# Patient Record
Sex: MALE | Race: WHITE | Employment: FULL TIME | URBAN - NONMETROPOLITAN AREA
[De-identification: names, ages, dates, MRNs, and addresses within clinical notes are randomized per-mention and may not be internally consistent; named-entity substitution may affect disease eponyms.]

---

## 2015-08-25 LAB
CHOLESTEROL, TOTAL: 96 MG/DL
CHOLESTEROL/HDL RATIO: 2.1
HDLC SERPL-MCNC: 45 MG/DL (ref 35–70)
LDL CHOLESTEROL CALCULATED: 32.2 MG/DL (ref 0–160)
TRIGL SERPL-MCNC: 94 MG/DL
VLDLC SERPL CALC-MCNC: 19 MG/DL

## 2017-08-11 DIAGNOSIS — G40.909 NONINTRACTABLE EPILEPSY WITHOUT STATUS EPILEPTICUS, UNSPECIFIED EPILEPSY TYPE (HCC): Primary | ICD-10-CM

## 2017-08-11 RX ORDER — PHENOBARBITAL 32.4 MG/1
32.4 TABLET ORAL 2 TIMES DAILY
Qty: 270 TABLET | Refills: 0 | Status: SHIPPED | OUTPATIENT
Start: 2017-08-11 | End: 2017-11-22 | Stop reason: SDUPTHER

## 2017-08-23 VITALS
SYSTOLIC BLOOD PRESSURE: 118 MMHG | HEART RATE: 74 BPM | DIASTOLIC BLOOD PRESSURE: 76 MMHG | HEIGHT: 68 IN | WEIGHT: 153 LBS | BODY MASS INDEX: 23.19 KG/M2

## 2017-08-23 DIAGNOSIS — G40.909 SEIZURE DISORDER (HCC): ICD-10-CM

## 2017-08-23 DIAGNOSIS — Q99.2 FRAGILE X SYNDROME: ICD-10-CM

## 2017-08-23 DIAGNOSIS — F41.9 ANXIETY: ICD-10-CM

## 2017-08-23 RX ORDER — ASCORBIC ACID 500 MG
500 TABLET ORAL DAILY
COMMUNITY

## 2017-08-23 RX ORDER — ALPRAZOLAM 0.25 MG/1
0.25 TABLET ORAL DAILY
COMMUNITY
End: 2021-09-29 | Stop reason: ALTCHOICE

## 2017-08-29 ENCOUNTER — OFFICE VISIT (OUTPATIENT)
Dept: FAMILY MEDICINE CLINIC | Age: 37
End: 2017-08-29
Payer: MEDICARE

## 2017-08-29 VITALS
HEIGHT: 68 IN | WEIGHT: 152 LBS | BODY MASS INDEX: 23.04 KG/M2 | HEART RATE: 76 BPM | SYSTOLIC BLOOD PRESSURE: 118 MMHG | DIASTOLIC BLOOD PRESSURE: 72 MMHG

## 2017-08-29 DIAGNOSIS — F41.9 ANXIETY: ICD-10-CM

## 2017-08-29 DIAGNOSIS — G40.909 SEIZURE DISORDER (HCC): Primary | ICD-10-CM

## 2017-08-29 DIAGNOSIS — Q99.2 FRAGILE X SYNDROME: ICD-10-CM

## 2017-08-29 DIAGNOSIS — Z23 NEED FOR INFLUENZA VACCINATION: ICD-10-CM

## 2017-08-29 PROCEDURE — 90686 IIV4 VACC NO PRSV 0.5 ML IM: CPT | Performed by: FAMILY MEDICINE

## 2017-08-29 PROCEDURE — 99395 PREV VISIT EST AGE 18-39: CPT | Performed by: FAMILY MEDICINE

## 2017-08-29 PROCEDURE — G0008 ADMIN INFLUENZA VIRUS VAC: HCPCS | Performed by: FAMILY MEDICINE

## 2017-08-29 ASSESSMENT — PATIENT HEALTH QUESTIONNAIRE - PHQ9
SUM OF ALL RESPONSES TO PHQ9 QUESTIONS 1 & 2: 0
2. FEELING DOWN, DEPRESSED OR HOPELESS: 0
1. LITTLE INTEREST OR PLEASURE IN DOING THINGS: 0
SUM OF ALL RESPONSES TO PHQ QUESTIONS 1-9: 0

## 2017-08-29 ASSESSMENT — ENCOUNTER SYMPTOMS
SHORTNESS OF BREATH: 0
WHEEZING: 0
ABDOMINAL PAIN: 0
ABDOMINAL DISTENTION: 0
CHEST TIGHTNESS: 0

## 2017-11-22 DIAGNOSIS — G40.909 NONINTRACTABLE EPILEPSY WITHOUT STATUS EPILEPTICUS, UNSPECIFIED EPILEPSY TYPE (HCC): ICD-10-CM

## 2017-11-22 RX ORDER — PHENOBARBITAL 32.4 MG/1
TABLET ORAL
Qty: 270 TABLET | Refills: 0 | Status: SHIPPED | OUTPATIENT
Start: 2017-11-22 | End: 2018-02-21 | Stop reason: SDUPTHER

## 2017-11-22 NOTE — TELEPHONE ENCOUNTER
Jesika Alcala is calling to request a refill on the following medication(s):  Requested Prescriptions     Pending Prescriptions Disp Refills    PHENobarbital (LUMINAL) 32.4 MG tablet [Pharmacy Med Name: PHENOBARBITAL 32.4MG TAB] 270 tablet 0     Sig: TAKE ONE TABLET BY MOUTH THREE TIMES DAILY       Last Visit Date (If Applicable):  1/67/5222    Next Visit Date:    Visit date not found

## 2018-02-21 DIAGNOSIS — G40.909 NONINTRACTABLE EPILEPSY WITHOUT STATUS EPILEPTICUS, UNSPECIFIED EPILEPSY TYPE (HCC): ICD-10-CM

## 2018-02-21 RX ORDER — PHENOBARBITAL 32.4 MG/1
TABLET ORAL
Qty: 270 TABLET | Refills: 5 | Status: SHIPPED | OUTPATIENT
Start: 2018-02-21 | End: 2018-08-20

## 2018-02-21 NOTE — TELEPHONE ENCOUNTER
Haim Yu is calling to request a refill on the following medication(s):  Requested Prescriptions     Pending Prescriptions Disp Refills    PHENobarbital (LUMINAL) 32.4 MG tablet [Pharmacy Med Name: PHENOBARBITAL 32.4MG TAB] 270 tablet 0     Sig: TAKE ONE TABLET BY MOUTH THREE TIMES DAILY       Last Visit Date (If Applicable):  2/05/1510    Next Visit Date:    Visit date not found

## 2018-08-31 ENCOUNTER — OFFICE VISIT (OUTPATIENT)
Dept: FAMILY MEDICINE CLINIC | Age: 38
End: 2018-08-31
Payer: MEDICARE

## 2018-08-31 VITALS
BODY MASS INDEX: 22.88 KG/M2 | SYSTOLIC BLOOD PRESSURE: 126 MMHG | DIASTOLIC BLOOD PRESSURE: 88 MMHG | HEIGHT: 68 IN | WEIGHT: 151 LBS | HEART RATE: 80 BPM

## 2018-08-31 DIAGNOSIS — G40.909 SEIZURE DISORDER (HCC): Primary | ICD-10-CM

## 2018-08-31 DIAGNOSIS — F41.9 ANXIETY: ICD-10-CM

## 2018-08-31 DIAGNOSIS — Q99.2 FRAGILE X SYNDROME: ICD-10-CM

## 2018-08-31 PROCEDURE — 99213 OFFICE O/P EST LOW 20 MIN: CPT | Performed by: FAMILY MEDICINE

## 2018-08-31 RX ORDER — PHENOBARBITAL 32.4 MG/1
32.4 TABLET ORAL 2 TIMES DAILY
COMMUNITY
End: 2018-08-31 | Stop reason: SDUPTHER

## 2018-08-31 RX ORDER — PHENOBARBITAL 32.4 MG/1
32.4 TABLET ORAL 3 TIMES DAILY
Qty: 270 TABLET | Refills: 3 | Status: SHIPPED | OUTPATIENT
Start: 2018-08-31 | End: 2019-04-06 | Stop reason: SDUPTHER

## 2018-08-31 ASSESSMENT — PATIENT HEALTH QUESTIONNAIRE - PHQ9
SUM OF ALL RESPONSES TO PHQ QUESTIONS 1-9: 0
1. LITTLE INTEREST OR PLEASURE IN DOING THINGS: 0
SUM OF ALL RESPONSES TO PHQ QUESTIONS 1-9: 0
SUM OF ALL RESPONSES TO PHQ9 QUESTIONS 1 & 2: 0
2. FEELING DOWN, DEPRESSED OR HOPELESS: 0

## 2018-09-05 ASSESSMENT — ENCOUNTER SYMPTOMS
ABDOMINAL PAIN: 0
WHEEZING: 0
ABDOMINAL DISTENTION: 0
CHEST TIGHTNESS: 0
SHORTNESS OF BREATH: 0

## 2018-12-10 ENCOUNTER — OFFICE VISIT (OUTPATIENT)
Dept: FAMILY MEDICINE CLINIC | Age: 38
End: 2018-12-10
Payer: MEDICARE

## 2018-12-10 VITALS
WEIGHT: 158 LBS | HEART RATE: 79 BPM | SYSTOLIC BLOOD PRESSURE: 114 MMHG | BODY MASS INDEX: 24.38 KG/M2 | DIASTOLIC BLOOD PRESSURE: 80 MMHG | OXYGEN SATURATION: 98 %

## 2018-12-10 DIAGNOSIS — Z02.5 ROUTINE SPORTS PHYSICAL EXAM: Primary | ICD-10-CM

## 2018-12-10 PROCEDURE — 99395 PREV VISIT EST AGE 18-39: CPT | Performed by: FAMILY MEDICINE

## 2018-12-11 ASSESSMENT — ENCOUNTER SYMPTOMS
EYES NEGATIVE: 1
GASTROINTESTINAL NEGATIVE: 1
RESPIRATORY NEGATIVE: 1

## 2019-04-06 DIAGNOSIS — G40.909 SEIZURE DISORDER (HCC): ICD-10-CM

## 2019-04-06 NOTE — TELEPHONE ENCOUNTER
Lorena Marin is calling to request a refill on the following medication(s):  Requested Prescriptions     Pending Prescriptions Disp Refills    PHENobarbital (LUMINAL) 32.4 MG tablet [Pharmacy Med Name: PHENOBARBITAL 32.4MG TAB] 270 tablet 3     Sig: TAKE 1 TABLET BY MOUTH THREE TIMES DAILY       Last Visit Date (If Applicable):  11/21/5976    Next Visit Date:    9/9/2019

## 2019-04-08 RX ORDER — PHENOBARBITAL 32.4 MG/1
TABLET ORAL
Qty: 270 TABLET | Refills: 3 | Status: SHIPPED | OUTPATIENT
Start: 2019-04-08 | End: 2019-10-15 | Stop reason: SDUPTHER

## 2019-09-17 ENCOUNTER — OFFICE VISIT (OUTPATIENT)
Dept: FAMILY MEDICINE CLINIC | Age: 39
End: 2019-09-17
Payer: MEDICARE

## 2019-09-17 VITALS
OXYGEN SATURATION: 98 % | BODY MASS INDEX: 22.88 KG/M2 | DIASTOLIC BLOOD PRESSURE: 80 MMHG | SYSTOLIC BLOOD PRESSURE: 122 MMHG | WEIGHT: 151 LBS | HEIGHT: 68 IN | HEART RATE: 78 BPM

## 2019-09-17 DIAGNOSIS — Z23 NEED FOR TETANUS BOOSTER: ICD-10-CM

## 2019-09-17 DIAGNOSIS — Q99.2 FRAGILE X SYNDROME: Primary | ICD-10-CM

## 2019-09-17 DIAGNOSIS — F41.9 ANXIETY: ICD-10-CM

## 2019-09-17 DIAGNOSIS — G40.909 SEIZURE DISORDER (HCC): ICD-10-CM

## 2019-09-17 PROCEDURE — 90715 TDAP VACCINE 7 YRS/> IM: CPT | Performed by: FAMILY MEDICINE

## 2019-09-17 PROCEDURE — G0438 PPPS, INITIAL VISIT: HCPCS | Performed by: FAMILY MEDICINE

## 2019-09-17 PROCEDURE — 90471 IMMUNIZATION ADMIN: CPT | Performed by: FAMILY MEDICINE

## 2019-09-17 ASSESSMENT — PATIENT HEALTH QUESTIONNAIRE - PHQ9
DEPRESSION UNABLE TO ASSESS: FUNCTIONAL CAPACITY MOTIVATION LIMITS ACCURACY
2. FEELING DOWN, DEPRESSED OR HOPELESS: 0
SUM OF ALL RESPONSES TO PHQ QUESTIONS 1-9: 0
1. LITTLE INTEREST OR PLEASURE IN DOING THINGS: 0
SUM OF ALL RESPONSES TO PHQ9 QUESTIONS 1 & 2: 0
SUM OF ALL RESPONSES TO PHQ QUESTIONS 1-9: 0

## 2019-09-17 ASSESSMENT — ENCOUNTER SYMPTOMS
EYES NEGATIVE: 1
RESPIRATORY NEGATIVE: 1
GASTROINTESTINAL NEGATIVE: 1

## 2019-09-17 NOTE — PROGRESS NOTES
HIV screen  12/10/2028 (Originally 8/18/1994)    Lipid screen  08/25/2020    DTaP/Tdap/Td vaccine (3 - Td) 09/17/2029    Pneumococcal 0-64 years Vaccine  Aged Out       No results found for: K, CREATININE, AST, ALT, TSH, HCT, LABA1C, MICROALBUR, PSA, GLUCOSE, MG, CALCIUM, [id number], VITD25, FERRITIN, TIBC, IRON   Lab Results   Component Value Date    CHOL 96 08/25/2015    TRIG 94 08/25/2015    HDL 45 08/25/2015       Subjective:      Review of Systems   Constitutional: Negative for activity change, fatigue and fever. HENT: Negative. Eyes: Negative. Respiratory: Negative. Cardiovascular: Negative. Gastrointestinal: Negative. Genitourinary: Negative. Neurological: Negative for seizures. Psychiatric/Behavioral: Negative. Objective:     /80   Pulse 78   Ht 5' 7.5\" (1.715 m)   Wt 151 lb (68.5 kg)   SpO2 98%   BMI 23.30 kg/m²     Physical Exam   Constitutional: He appears well-developed and well-nourished. No distress. HENT:   Head: Normocephalic and atraumatic. Right Ear: Hearing and tympanic membrane normal.   Left Ear: Hearing and tympanic membrane normal.   Nose: Nose normal.   Mouth/Throat: Oropharynx is clear and moist and mucous membranes are normal. Normal dentition. Tonsils are 0 on the right. Tonsils are 0 on the left. No tonsillar exudate. Neck: Neck supple. Carotid bruit is not present. No thyromegaly present. Cardiovascular: Normal rate, regular rhythm, S1 normal, S2 normal and normal heart sounds. Exam reveals no gallop and no friction rub. No murmur heard. Simultaneous radial and femoral pulses bilaterally   Pulmonary/Chest: Effort normal. He has no decreased breath sounds. He has no wheezes. He has no rhonchi. He has no rales. Chest wall is not dull to percussion. Abdominal: Soft. Bowel sounds are normal. There is no hepatosplenomegaly. There is no tenderness.    Genitourinary:   Genitourinary Comments: Exam deferred   Neurological: No cranial nerve

## 2019-09-25 LAB
A/G RATIO: 1.7 RATIO
AGE FOR GFR: 40
ALBUMIN: 4.8 G/DL (ref 3.5–5)
ALK PHOSPHATASE: 78 UNITS/L (ref 38–126)
ALT SERPL-CCNC: 31 UNITS/L (ref 21–72)
ANION GAP SERPL CALCULATED.3IONS-SCNC: 16 MMOL/L
AST SERPL-CCNC: 13 UNITS/L (ref 17–59)
BASOPHILS # BLD: 0.1 THOU/MM3 (ref 0–0.3)
BILIRUB SERPL-MCNC: 0.3 MG/DL (ref 0.2–1.3)
BUN BLDV-MCNC: 13 MG/DL (ref 9–20)
CALCIUM SERPL-MCNC: 9.4 MG/DL (ref 8.4–10.2)
CHLORIDE BLD-SCNC: 103 MMOL/L (ref 98–120)
CO2: 27 MMOL/L (ref 22–31)
CREAT SERPL-MCNC: 0.6 MG/DL (ref 0.7–1.3)
DIFFERENTIAL: AUTOMATED DIFF
EGFR BF: 134 ML/MIN/1.73 M2
EGFR BM: 181 ML/MIN/1.73 M2
EGFR WF: 111 ML/MIN/1.73 M2
EGFR WM: 149 ML/MIN/1.73 M2
EOSINOPHIL # BLD: 0.12 THOU/MM3 (ref 0–1.1)
GLOBULIN: 2.8 G/DL
GLUCOSE: 90 MG/DL (ref 75–110)
HCT VFR BLD CALC: 47 % (ref 42–52)
HEMOGLOBIN: 14.9 G/DL (ref 13.8–17)
LYMPHOCYTES # BLD: 2.82 THOU/MM3 (ref 1–5.5)
MCH RBC QN AUTO: 29.5 PG (ref 28.5–32)
MCHC RBC AUTO-ENTMCNC: 31.6 G/DL (ref 32–37)
MCV RBC AUTO: 93.2 FL (ref 80–94)
MONOCYTES # BLD: 0.64 THOU/MM3 (ref 0.1–1)
NEUTROPHILS: 4.09 THOU/MM3 (ref 2–8.1)
PDW BLD-RTO: 12.3 % (ref 10–15)
PLATELET # BLD: 207 THOU/MM3 (ref 130–400)
PMV BLD AUTO: 8.7 FL (ref 7.4–11)
POTASSIUM SERPL-SCNC: 4 MMOL/L (ref 3.6–5)
RBC # BLD: 5.05 M/UL (ref 4.7–6.1)
SODIUM BLD-SCNC: 142 MMOL/L (ref 135–145)
TOTAL PROTEIN: 7.6 G/DL (ref 6.3–8.2)
WBC # BLD: 7.78 THOU/ML3 (ref 4.8–10)

## 2019-09-26 DIAGNOSIS — G40.909 SEIZURE DISORDER (HCC): ICD-10-CM

## 2019-10-15 DIAGNOSIS — G40.909 SEIZURE DISORDER (HCC): ICD-10-CM

## 2019-10-15 RX ORDER — PHENOBARBITAL 32.4 MG/1
TABLET ORAL
Qty: 270 TABLET | Refills: 3 | Status: SHIPPED | OUTPATIENT
Start: 2019-10-15 | End: 2020-04-20

## 2020-04-20 RX ORDER — PHENOBARBITAL 32.4 MG/1
TABLET ORAL
Qty: 270 TABLET | Refills: 0 | Status: SHIPPED | OUTPATIENT
Start: 2020-04-20 | End: 2020-08-12 | Stop reason: SDUPTHER

## 2020-08-12 RX ORDER — PHENOBARBITAL 32.4 MG/1
TABLET ORAL
Qty: 270 TABLET | Refills: 0 | Status: SHIPPED | OUTPATIENT
Start: 2020-08-12 | End: 2020-11-16

## 2020-09-21 ENCOUNTER — OFFICE VISIT (OUTPATIENT)
Dept: FAMILY MEDICINE CLINIC | Age: 40
End: 2020-09-21
Payer: MEDICARE

## 2020-09-21 ENCOUNTER — HOSPITAL ENCOUNTER (OUTPATIENT)
Age: 40
Setting detail: SPECIMEN
Discharge: HOME | End: 2020-09-21
Payer: MEDICARE

## 2020-09-21 VITALS
HEART RATE: 68 BPM | BODY MASS INDEX: 25 KG/M2 | WEIGHT: 162 LBS | DIASTOLIC BLOOD PRESSURE: 68 MMHG | OXYGEN SATURATION: 98 % | SYSTOLIC BLOOD PRESSURE: 114 MMHG

## 2020-09-21 LAB
ABSOLUTE EOS #: 0.1 K/UL (ref 0–0.44)
ABSOLUTE IMMATURE GRANULOCYTE: <0.03 K/UL (ref 0–0.3)
ABSOLUTE LYMPH #: 2.44 K/UL (ref 1.1–3.7)
ABSOLUTE MONO #: 0.59 K/UL (ref 0.1–1.2)
ALBUMIN SERPL-MCNC: 4.7 G/DL (ref 3.5–5.2)
ALBUMIN/GLOBULIN RATIO: 1.7 (ref 1–2.5)
ALP BLD-CCNC: 102 U/L (ref 40–129)
ALT SERPL-CCNC: 27 U/L (ref 5–41)
ANION GAP SERPL CALCULATED.3IONS-SCNC: 8 MMOL/L (ref 9–17)
AST SERPL-CCNC: 14 U/L
BASOPHILS # BLD: 1 % (ref 0–2)
BASOPHILS ABSOLUTE: 0.05 K/UL (ref 0–0.2)
BILIRUB SERPL-MCNC: 0.14 MG/DL (ref 0.3–1.2)
BUN BLDV-MCNC: 8 MG/DL (ref 6–20)
BUN/CREAT BLD: 15 (ref 9–20)
CALCIUM SERPL-MCNC: 9.3 MG/DL (ref 8.6–10.4)
CHLORIDE BLD-SCNC: 105 MMOL/L (ref 98–107)
CO2: 27 MMOL/L (ref 20–31)
CREAT SERPL-MCNC: 0.55 MG/DL (ref 0.7–1.2)
DIFFERENTIAL TYPE: NORMAL
EOSINOPHILS RELATIVE PERCENT: 1 % (ref 1–4)
ESTIMATED AVERAGE GLUCOSE: 108 MG/DL
GFR AFRICAN AMERICAN: >60 ML/MIN
GFR NON-AFRICAN AMERICAN: >60 ML/MIN
GFR SERPL CREATININE-BSD FRML MDRD: ABNORMAL ML/MIN/{1.73_M2}
GFR SERPL CREATININE-BSD FRML MDRD: ABNORMAL ML/MIN/{1.73_M2}
GLUCOSE BLD-MCNC: 97 MG/DL (ref 70–99)
HBA1C MFR BLD: 5.4 % (ref 4.8–5.9)
HCT VFR BLD CALC: 46.3 % (ref 40.7–50.3)
HEMOGLOBIN: 14.9 G/DL (ref 13–17)
IMMATURE GRANULOCYTES: 0 %
LYMPHOCYTES # BLD: 34 % (ref 24–43)
MCH RBC QN AUTO: 30 PG (ref 25.2–33.5)
MCHC RBC AUTO-ENTMCNC: 32.2 G/DL (ref 25.2–33.5)
MCV RBC AUTO: 93.2 FL (ref 82.6–102.9)
MONOCYTES # BLD: 8 % (ref 3–12)
NRBC AUTOMATED: 0 PER 100 WBC
PDW BLD-RTO: 13 % (ref 11.8–14.4)
PHENOBARBITAL DATE LAST DOSE: NORMAL
PHENOBARBITAL DOSE AMOUNT: NORMAL
PHENOBARBITAL TIME LAST DOSE: NORMAL
PHENOBARBITAL: 16.9 UG/ML (ref 15–40)
PLATELET # BLD: 219 K/UL (ref 138–453)
PLATELET ESTIMATE: NORMAL
PMV BLD AUTO: 11.6 FL (ref 8.1–13.5)
POTASSIUM SERPL-SCNC: 4 MMOL/L (ref 3.7–5.3)
RBC # BLD: 4.97 M/UL (ref 4.21–5.77)
RBC # BLD: NORMAL 10*6/UL
SEG NEUTROPHILS: 56 % (ref 36–65)
SEGMENTED NEUTROPHILS ABSOLUTE COUNT: 4.03 K/UL (ref 1.5–8.1)
SODIUM BLD-SCNC: 140 MMOL/L (ref 135–144)
THYROXINE, FREE: 0.94 NG/DL (ref 0.93–1.7)
TOTAL PROTEIN: 7.4 G/DL (ref 6.4–8.3)
TSH SERPL DL<=0.05 MIU/L-ACNC: 1.74 MIU/L (ref 0.3–5)
VITAMIN D 25-HYDROXY: 18.7 NG/ML (ref 30–100)
WBC # BLD: 7.2 K/UL (ref 3.5–11.3)
WBC # BLD: NORMAL 10*3/UL

## 2020-09-21 PROCEDURE — 85025 COMPLETE CBC W/AUTO DIFF WBC: CPT

## 2020-09-21 PROCEDURE — 90686 IIV4 VACC NO PRSV 0.5 ML IM: CPT | Performed by: INTERNAL MEDICINE

## 2020-09-21 PROCEDURE — 84439 ASSAY OF FREE THYROXINE: CPT

## 2020-09-21 PROCEDURE — 82306 VITAMIN D 25 HYDROXY: CPT

## 2020-09-21 PROCEDURE — 83036 HEMOGLOBIN GLYCOSYLATED A1C: CPT

## 2020-09-21 PROCEDURE — 80053 COMPREHEN METABOLIC PANEL: CPT

## 2020-09-21 PROCEDURE — 84443 ASSAY THYROID STIM HORMONE: CPT

## 2020-09-21 PROCEDURE — 36415 COLL VENOUS BLD VENIPUNCTURE: CPT

## 2020-09-21 PROCEDURE — 99214 OFFICE O/P EST MOD 30 MIN: CPT | Performed by: INTERNAL MEDICINE

## 2020-09-21 PROCEDURE — 80184 ASSAY OF PHENOBARBITAL: CPT

## 2020-09-21 NOTE — PROGRESS NOTES
[address]  [address]  Dept: [phone]  Dept Fax: [phone]: [phone]     Visit Date:  9/21/2020    Patient:  Lj Mcmanus  YOB: 1980    HPI:   Lj Mcmanus presents today for   Chief Complaint   Patient presents with   [facility] Established New Doctor     patient is here today to establish and has no concerns today. Marilou Neil HPI-38year-old male with a history of epilepsy/ seizure disorder,fragile X yndrome, mild mental retardation is coming in for a 6-month follow-up. Per mom and patient no episodes of seizures since last seen. In fact he has not had seizure for a very long time. He continues to take phenobarbital.  He is ready for a blood test today also would check his phenobarbital levels. 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 No cardiopulmonary related complaints. Anxiety- Well controlled. Medications  Prior to Visit Medications    Medication Sig Taking? Authorizing Provider   PHENobarbital (LUMINAL) 32.4 MG tablet TAKE 1 TABLET BY MOUTH THREE TIMES DAILY Yes Liss Guzman MD   ALPRAZolam (XANAX) 0.25 MG tablet Take 0.25 mg by mouth daily Indications: prn Yes Historical Provider, MD   Ascorbic Acid (VITAMIN C) 500 MG tablet Take 500 mg by mouth daily Yes Historical Provider, MD        Allergies:  has No Known Allergies. Past Medical History:   has a past medical history of Anxiety, Fragile X syndrome in male, and Seizures ([address] Past Surgical History   has no past surgical history on file. Family History  family history includes High Blood Pressure in his father; High Cholesterol in his father; Other in his father. Social History   reports that he has never smoked.  He has never used smokeless tobacco.    Health Maintenance:    Health Maintenance   Topic Date Due    Annual Wellness Visit (AWV)  09/17/2019    Lipid screen  08/25/2020    HIV screen  12/10/2028 (Originally 8/18/1994)    DTaP/Tdap/Td vaccine (3 - Td) 09/17/2029    Flu vaccine  Completed    Hepatitis A vaccine  Aged Out    Hepatitis B vaccine  Aged Out    Hib vaccine  Aged Out    Meningococcal (ACWY) vaccine  Aged Out    Pneumococcal 0-64 years Vaccine  Aged Out       Subjective:      Review of Systems   Constitutional: Negative for fatigue, fever and unexpected weight change. HENT: Negative for ear pain, postnasal drip, rhinorrhea, sinus pain, sore throat and trouble swallowing. Eyes: Negative for visual disturbance. Respiratory: Negative for cough, chest tightness and shortness of breath. Cardiovascular: Negative for chest pain and leg swelling. Gastrointestinal: Negative for abdominal pain, blood in stool and diarrhea. Endocrine: Negative for polyuria. Genitourinary: Negative for difficulty urinating and flank pain. Musculoskeletal: Negative for arthralgias, joint swelling and myalgias. Skin: Negative for rash. Allergic/Immunologic: Negative for environmental allergies. Neurological: Negative for weakness, light-headedness, numbness and headaches. Hematological: Negative for adenopathy. Psychiatric/Behavioral: Negative for behavioral problems and suicidal ideas. The patient is not nervous/anxious. Objective:     /68 (Site: Right Upper Arm, Position: Sitting)   Pulse 68   Wt 162 lb (73.5 kg)   SpO2 98%   BMI 25.00 kg/m²     Physical Exam  Vitals signs and nursing note reviewed. HENT:      Head: Normocephalic and atraumatic. Cardiovascular:      Rate and Rhythm: Normal rate and regular rhythm. Pulmonary:      Effort: Pulmonary effort is normal. No respiratory distress. Breath sounds: Normal breath sounds. No stridor. No wheezing, rhonchi or rales. Chest:      Chest wall: No tenderness. Abdominal:      General: Abdomen is flat. There is no distension. Palpations: Abdomen is soft.  There is no mass.      Tenderness: There is no abdominal tenderness. There is no guarding or rebound. Hernia: No hernia is present. Musculoskeletal: Normal range of motion. General: No swelling, tenderness, deformity or signs of injury. Right lower leg: No edema. Left lower leg: No edema. Skin:     General: Skin is warm. Neurological:      Mental Status: He is alert and oriented to person, place, and time. Mental status is at baseline. Psychiatric:         Mood and Affect: Mood normal.             Assessment       Diagnosis Orders   1. Seizure disorder (HCC)  CBC With Auto Differential    Comprehensive Metabolic Panel    TSH    T4, Free    Vitamin D 25 Hydroxy    Hemoglobin A1C    Phenobarbital Level    INFLUENZA, QUADV, 3 YRS AND OLDER, IM PF, PREFILL SYR OR SDV, 0.5ML (AFLURIA QUADV, PF)   2. Fragile X syndrome  CBC With Auto Differential    Comprehensive Metabolic Panel    TSH    T4, Free    Vitamin D 25 Hydroxy    Hemoglobin A1C    Phenobarbital Level    INFLUENZA, QUADV, 3 YRS AND OLDER, IM PF, PREFILL SYR OR SDV, 0.5ML (AFLURIA QUADV, PF)   3. Anxiety  CBC With Auto Differential    Comprehensive Metabolic Panel    TSH    T4, Free    Vitamin D 25 Hydroxy    Hemoglobin A1C    Phenobarbital Level    INFLUENZA, QUADV, 3 YRS AND OLDER, IM PF, PREFILL SYR OR SDV, 0.5ML (AFLURIA QUADV, PF)   4. Wellness examination  CBC With Auto Differential    Comprehensive Metabolic Panel    TSH    T4, Free    Vitamin D 25 Hydroxy    Hemoglobin A1C    Phenobarbital Level    INFLUENZA, QUADV, 3 YRS AND OLDER, IM PF, PREFILL SYR OR SDV, 0.5ML (AFLURIA QUADV, PF)   5. Needs flu shot  CBC With Auto Differential    Comprehensive Metabolic Panel    TSH    T4, Free    Vitamin D 25 Hydroxy    Hemoglobin A1C    Phenobarbital Level    INFLUENZA, QUADV, 3 YRS AND OLDER, IM PF, PREFILL SYR OR SDV, 0.5ML (AFLURIA QUADV, PF)         PLAN   Received flu shot today. Check all basic labs and phenobarb levels.      Orders Placed This Encounter   Procedures    INFLUENZA, QUADV, 3 YRS AND OLDER, IM PF, PREFILL SYR OR SDV, 0.5ML (AFLURIA QUADV, PF)    CBC With Auto Differential     Standing Status:   Future     Number of Occurrences:   1     Standing Expiration Date:   9/21/2021    Comprehensive Metabolic Panel     Standing Status:   Future     Number of Occurrences:   1     Standing Expiration Date:   9/21/2021    TSH     Standing Status:   Future     Number of Occurrences:   1     Standing Expiration Date:   9/21/2021    T4, Free     Standing Status:   Future     Number of Occurrences:   1     Standing Expiration Date:   9/21/2021    Vitamin D 25 Hydroxy     Standing Status:   Future     Number of Occurrences:   1     Standing Expiration Date:   9/21/2021    Hemoglobin A1C     Standing Status:   Future     Number of Occurrences:   1     Standing Expiration Date:   9/21/2021    Phenobarbital Level     Standing Status:   Future     Number of Occurrences:   1     Standing Expiration Date:   9/21/2021     Order Specific Question:   Dose Schedule & Time of Last Dose? Answer:   32.4 mg took it this morning-takes 1 pill three times daily,        No follow-ups on file. Patient given educational materials - see patient instructions. Discussed use, benefit, and side effects of prescribed medications. All patient questions answered. Pt voiced understanding. Reviewed health maintenance.        Electronically signed Joey Katz MD on 9/21/2020 at 9:31 AM EDT

## 2020-09-22 ASSESSMENT — ENCOUNTER SYMPTOMS
ABDOMINAL PAIN: 0
SORE THROAT: 0
DIARRHEA: 0
SINUS PAIN: 0
SHORTNESS OF BREATH: 0
COUGH: 0
BLOOD IN STOOL: 0
CHEST TIGHTNESS: 0
TROUBLE SWALLOWING: 0
RHINORRHEA: 0

## 2020-11-16 RX ORDER — PHENOBARBITAL 32.4 MG/1
TABLET ORAL
Qty: 270 TABLET | Refills: 0 | Status: SHIPPED | OUTPATIENT
Start: 2020-11-16 | End: 2021-01-16

## 2020-11-16 RX ORDER — PHENOBARBITAL 32.4 MG/1
TABLET ORAL
Qty: 270 TABLET | Refills: 0 | Status: SHIPPED | OUTPATIENT
Start: 2020-11-16 | End: 2020-11-16 | Stop reason: SDUPTHER

## 2020-11-16 NOTE — TELEPHONE ENCOUNTER
This medication is not available at [facility] at this time but is available at [facility]. Can you resend a new script to [facility]?

## 2021-02-12 ENCOUNTER — TELEPHONE (OUTPATIENT)
Dept: FAMILY MEDICINE CLINIC | Age: 41
End: 2021-02-12

## 2021-02-12 DIAGNOSIS — G40.909 SEIZURE DISORDER (HCC): Primary | ICD-10-CM

## 2021-02-12 RX ORDER — PHENOBARBITAL 32.4 MG/1
TABLET ORAL
Qty: 270 TABLET | Refills: 0 | OUTPATIENT
Start: 2021-02-12 | End: 2021-05-12

## 2021-02-12 NOTE — TELEPHONE ENCOUNTER
Spoke to the pharmacist at [facility] and gave them a verbal order for the script. Also called kacey mckeon mom and left her a vm letting her know the script was called in.

## 2021-02-15 ENCOUNTER — TELEPHONE (OUTPATIENT)
Dept: FAMILY MEDICINE CLINIC | Age: 41
End: 2021-02-15

## 2021-04-19 ENCOUNTER — TELEPHONE (OUTPATIENT)
Dept: FAMILY MEDICINE CLINIC | Age: 41
End: 2021-04-19

## 2021-04-19 NOTE — TELEPHONE ENCOUNTER
pts mom called stating that the pt received a notice for jury duty. Will you give him a letter stating he can not do jury duty?

## 2021-08-31 ENCOUNTER — TELEPHONE (OUTPATIENT)
Dept: FAMILY MEDICINE CLINIC | Age: 41
End: 2021-08-31

## 2021-08-31 NOTE — TELEPHONE ENCOUNTER
Patient's mother called to check on papers that she dropped off several weeks ago that would help her son get a job. Please call back.

## 2021-09-29 ENCOUNTER — TELEPHONE (OUTPATIENT)
Dept: FAMILY MEDICINE CLINIC | Age: 41
End: 2021-09-29

## 2021-09-29 ENCOUNTER — OFFICE VISIT (OUTPATIENT)
Dept: FAMILY MEDICINE CLINIC | Age: 41
End: 2021-09-29
Payer: MEDICARE

## 2021-09-29 VITALS
SYSTOLIC BLOOD PRESSURE: 110 MMHG | HEIGHT: 68 IN | WEIGHT: 157 LBS | DIASTOLIC BLOOD PRESSURE: 82 MMHG | BODY MASS INDEX: 23.79 KG/M2 | OXYGEN SATURATION: 98 % | HEART RATE: 76 BPM | RESPIRATION RATE: 20 BRPM

## 2021-09-29 DIAGNOSIS — Z13.220 SCREENING, LIPID: ICD-10-CM

## 2021-09-29 DIAGNOSIS — Z53.20 SCREENING FOR HEPATITIS C DECLINED: ICD-10-CM

## 2021-09-29 DIAGNOSIS — Z11.59 ENCOUNTER FOR HEPATITIS C SCREENING TEST FOR LOW RISK PATIENT: ICD-10-CM

## 2021-09-29 DIAGNOSIS — E55.9 VITAMIN D DEFICIENCY: ICD-10-CM

## 2021-09-29 DIAGNOSIS — G40.909 SEIZURE DISORDER (HCC): Primary | ICD-10-CM

## 2021-09-29 DIAGNOSIS — F41.9 ANXIETY: ICD-10-CM

## 2021-09-29 DIAGNOSIS — Q99.2 FRAGILE X SYNDROME: ICD-10-CM

## 2021-09-29 LAB
ANION GAP SERPL CALCULATED.3IONS-SCNC: 10.1 MMOL/L
BASOPHILS %: 0.28 (ref 0–3)
BASOPHILS ABSOLUTE: 0.03 (ref 0–0.3)
CHLORIDE BLD-SCNC: 104 MMOL/L (ref 98–120)
CHOLESTEROL/HDL RATIO: 3.03 RATIO (ref 0–4.5)
CHOLESTEROL: 109 MG/DL (ref 50–200)
CO2: 27 MMOL/L (ref 22–31)
CREAT SERPL-MCNC: 0.6 MG/DL (ref 0.7–1.3)
EOSINOPHILS %: 0.85 (ref 0–10)
EOSINOPHILS ABSOLUTE: 0.08 (ref 0–1.1)
GFR CALCULATED: > 60
HCT VFR BLD CALC: 46.5 % (ref 42–52)
HDLC SERPL-MCNC: 36 MG/DL (ref 36–68)
HEMOGLOBIN: 15.7 (ref 13.8–17.8)
LDL CHOLESTEROL CALCULATED: 48 MG/DL (ref 0–160)
LYMPHOCYTE %: 48.69 (ref 20–51.1)
LYMPHOCYTES ABSOLUTE: 4.51 (ref 1–5.5)
MCH RBC QN AUTO: 30.3 PG (ref 28.5–32.5)
MCHC RBC AUTO-ENTMCNC: 33.7 G/DL (ref 32–37)
MCV RBC AUTO: 89.9 FL (ref 80–94)
MONOCYTES %: 5.62 (ref 1.7–9.3)
MONOCYTES ABSOLUTE: 0.52 (ref 0.1–1)
NEUTROPHILS %: 44.56 (ref 42.2–75.2)
NEUTROPHILS ABSOLUTE: 4.12 (ref 2–8.1)
PDW BLD-RTO: 11.9 % (ref 10–15.5)
PLATELET # BLD: 241.2 THOU/MM3 (ref 130–400)
POTASSIUM SERPL-SCNC: 4.1 MMOL/L (ref 3.6–5)
RBC: 5.17 M/UL (ref 4.7–6.1)
SODIUM BLD-SCNC: 141 MMOL/L (ref 135–145)
TRIGL SERPL-MCNC: 125 MG/DL (ref 10–250)
VITAMIN D 25-HYDROXY: 60.2 NG/ML (ref 30–100)
VLDLC SERPL CALC-MCNC: 25 MG/DL (ref 0–50)
WBC: 9.3 THOU/ML3 (ref 4.8–10.8)

## 2021-09-29 PROCEDURE — 90674 CCIIV4 VAC NO PRSV 0.5 ML IM: CPT | Performed by: FAMILY MEDICINE

## 2021-09-29 PROCEDURE — G0008 ADMIN INFLUENZA VIRUS VAC: HCPCS | Performed by: FAMILY MEDICINE

## 2021-09-29 PROCEDURE — 99214 OFFICE O/P EST MOD 30 MIN: CPT | Performed by: FAMILY MEDICINE

## 2021-09-29 PROCEDURE — PBSHW INFLUENZA, MDCK QUADV, 2 YRS AND OLDER, IM, PF, PREFILL SYR OR SDV, 0.5ML (FLUCELVAX QUADV, PF): Performed by: FAMILY MEDICINE

## 2021-09-29 RX ORDER — MELATONIN
2000 DAILY
Qty: 60 TABLET | Refills: 5 | Status: SHIPPED | OUTPATIENT
Start: 2021-09-29

## 2021-09-29 RX ORDER — PHENOBARBITAL 32.4 MG/1
32.4 TABLET ORAL 2 TIMES DAILY
COMMUNITY
End: 2022-01-31

## 2021-09-29 RX ORDER — PHENOBARBITAL 15 MG/1
32.4 TABLET ORAL 2 TIMES DAILY
COMMUNITY
End: 2021-09-29

## 2021-09-29 SDOH — ECONOMIC STABILITY: FOOD INSECURITY: WITHIN THE PAST 12 MONTHS, THE FOOD YOU BOUGHT JUST DIDN'T LAST AND YOU DIDN'T HAVE MONEY TO GET MORE.: NEVER TRUE

## 2021-09-29 SDOH — ECONOMIC STABILITY: FOOD INSECURITY: WITHIN THE PAST 12 MONTHS, YOU WORRIED THAT YOUR FOOD WOULD RUN OUT BEFORE YOU GOT MONEY TO BUY MORE.: NEVER TRUE

## 2021-09-29 ASSESSMENT — SOCIAL DETERMINANTS OF HEALTH (SDOH): HOW HARD IS IT FOR YOU TO PAY FOR THE VERY BASICS LIKE FOOD, HOUSING, MEDICAL CARE, AND HEATING?: NOT HARD AT ALL

## 2021-09-29 ASSESSMENT — ENCOUNTER SYMPTOMS: SHORTNESS OF BREATH: 0

## 2021-09-29 ASSESSMENT — PATIENT HEALTH QUESTIONNAIRE - PHQ9
SUM OF ALL RESPONSES TO PHQ9 QUESTIONS 1 & 2: 0
1. LITTLE INTEREST OR PLEASURE IN DOING THINGS: 0
SUM OF ALL RESPONSES TO PHQ QUESTIONS 1-9: 0
SUM OF ALL RESPONSES TO PHQ QUESTIONS 1-9: 0
2. FEELING DOWN, DEPRESSED OR HOPELESS: 0
SUM OF ALL RESPONSES TO PHQ QUESTIONS 1-9: 0

## 2021-09-29 NOTE — PROGRESS NOTES
[address] [address]  Dept: [phone]  Dept Fax: [phone]    Paulette Erickson is a 42 y.o. male who presents today for his medical conditions/complaints as noted below. Paulette Erickson c/o of Established New Doctor and Annual Exam      HPI:     HPI  Pt here to establish with new provider as informed Dr Almond Siemens would be leaving soon. Hx of seizure disorder with last known seizure 5 and Fragile X syndrome as well as anxiety  Last phenobarbital level noted normal 9/21/20. Using xanax daily as needed- not recently due to feeling tired with it. Needing work note to be able to work at [employer]- prior chief stopped due to covid, needs to avoid ladders or situations that would put patient or others at risk such as grill work etc.  Following with neurology per caregiver with patient. [address] basketball per pt and mother    BP Readings from Last 3 Encounters:   09/29/21 110/82   09/21/20 114/68   09/17/19 122/80          (goal 120/80)    Past Medical History:   Diagnosis Date    Anxiety     Fragile X syndrome in male     Seizures ([address]       No past surgical history on file. Family History   Problem Relation Age of Onset    Other Father         GERD    High Cholesterol Father     High Blood Pressure Father        Social History     Tobacco Use    Smoking status: Never Smoker    Smokeless tobacco: Never Used   Substance Use Topics    Alcohol use: Not on file      Prior to Visit Medications    Medication Sig Taking?  Authorizing Provider   ALPRAZolam (XANAX) 0.25 MG tablet Take 0.25 mg by mouth daily Indications: prn  Historical Provider, MD   Ascorbic Acid (VITAMIN C) 500 MG tablet Take 500 mg by mouth daily  Historical Provider, MD     No Known Allergies    Health Maintenance   Topic Date Due    Hepatitis C screen  Never done    Annual Wellness Visit (AWV)  Never done    Lipid screen  08/25/2020    HIV screen  12/10/2028 (Originally 8/18/1994)    DTaP/Tdap/Td vaccine (3 - Td or Tdap) 09/17/2029    Flu vaccine  Completed    COVID-19 Vaccine  Completed    Hepatitis A vaccine  Aged Out    Hepatitis B vaccine  Aged Out    Hib vaccine  Aged Out    Meningococcal (ACWY) vaccine  Aged Out    Pneumococcal 0-64 years Vaccine  Aged Out       Subjective:      Review of Systems   Constitutional: Negative for fatigue. Respiratory: Negative for shortness of breath. Neurological: Negative for dizziness and headaches. Objective:     /82 (Site: Left Upper Arm, Position: Sitting, Cuff Size: Large Adult)   Pulse 76   Resp 20   Ht 5' 7.5\" (1.715 m)   Wt 157 lb (71.2 kg)   SpO2 98%   BMI 24.23 kg/m²     Physical Exam  Constitutional:       General: He is not in acute distress. Appearance: He is not ill-appearing. HENT:      Head: Normocephalic. Eyes:      Conjunctiva/sclera: Conjunctivae normal.   Neck:      Vascular: No carotid bruit. Cardiovascular:      Rate and Rhythm: Normal rate and regular rhythm. Pulmonary:      Effort: Pulmonary effort is normal. No respiratory distress. Abdominal:      General: Bowel sounds are normal.   Musculoskeletal:         General: No swelling. Cervical back: Neck supple. Neurological:      Mental Status: He is alert. Psychiatric:         Thought Content: Thought content normal.         Assessment:     1. Seizure disorder ([facility]    2. Fragile X syndrome    3. Anxiety    4. Vitamin D deficiency    5. Screening, lipid    6. Screening for hepatitis C declined    7. Encounter for hepatitis C screening test for low risk patient      No results found for this visit on 09/29/21.       Plan:     Orders Placed This Encounter   Procedures    INFLUENZA, MDCK QUADV, 2 YRS AND OLDER, IM, PF, PREFILL SYR OR SDV, 0.5ML (FLUCELVAX QUADV, PF)    Phenobarbital Level     Standing Status:   Future     Standing Expiration Date:   9/29/2022     Order Specific Question: Dose Schedule & Time of Last Dose? Answer:   9/29/21 at 630 am    CBC Auto Differential     Standing Status:   Future     Standing Expiration Date:   9/29/2022    Electrolyte Panel     Standing Status:   Future     Standing Expiration Date:   9/29/2022    Creatinine, Serum     Standing Status:   Future     Standing Expiration Date:   9/29/2022    Lipid Panel     Standing Status:   Future     Standing Expiration Date:   9/29/2022     Order Specific Question:   Is Patient Fasting?/# of Hours     Answer:   10-12    Hepatitis C Antibody     Standing Status:   Future     Standing Expiration Date:   9/29/2022    Vitamin D 25 Hydroxy     Standing Status:   Future     Standing Expiration Date:   9/29/2022           Return in about 1 year (around 9/29/2022) for seizure disorder, vitamin D defiency. 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       Discussed use, benefit, and side effects of prescribed medications. All patient questions answered. Pt voiced understanding. Reviewed health maintenance flu vaccine, covid booster reviewed. Instructed to continue current medications, diet and exercise. Patient agreed with treatment plan. Follow up as directed.      Electronically signed by Erika Umana MD on 9/29/2021

## 2021-09-29 NOTE — PATIENT INSTRUCTIONS
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

## 2021-09-29 NOTE — TELEPHONE ENCOUNTER
Phenobarbitol dosage is 32.4 mg tid. Vitamin D3 is 2000 IU. Was taking bid. Let them know if you want to change.

## 2021-09-30 DIAGNOSIS — G40.909 SEIZURE DISORDER (HCC): Primary | ICD-10-CM

## 2021-09-30 LAB
HEPATITIS C ANTIBODY: NONREACTIVE
PHENOBARBITAL DATE LAST DOSE: NORMAL
PHENOBARBITAL DOSE AMOUNT: NORMAL
PHENOBARBITAL TIME LAST DOSE: NORMAL
PHENOBARBITAL: 18.1 UG/ML (ref 15–40)

## 2021-10-01 LAB
BACTERIA, URINE: ABNORMAL
BILIRUBIN URINE: NEGATIVE
BLOOD, URINE: ABNORMAL
CASTS UA: ABNORMAL
CLARITY: ABNORMAL
COLOR, URINE: ABNORMAL
CRYSTALS, UA: ABNORMAL
GLUCOSE URINE: NEGATIVE MG/DL
KETONES, URINE: NEGATIVE MG/DL
LEUKOCYTE ESTERASE, URINE: NEGATIVE
MUCUS, URINE: ABNORMAL
NITRITE, URINE: NEGATIVE
PH UA: 6 (ref 5–8.5)
PROTEIN UA: ABNORMAL MG/DL
RBC URINE: ABNORMAL (ref 0–2)
SPECIFIC GRAVITY, URINE: 1.02 MG/DL (ref 1–1.03)
SQUAMOUS EPITHELIAL: ABNORMAL
TRICHOMONAS, URINE: ABNORMAL
UROBILINOGEN, URINE: 0.2 MG/DL (ref 0.2–1)
WBC URINE: ABNORMAL (ref 0–4)
YEAST, URINE: ABNORMAL

## 2021-10-14 ENCOUNTER — TELEPHONE (OUTPATIENT)
Dept: FAMILY MEDICINE CLINIC | Age: 41
End: 2021-10-14

## 2021-10-14 NOTE — TELEPHONE ENCOUNTER
----- Message from Josue Quiñonez sent at 10/14/2021  3:14 PM EDT -----  Subject: Message to Provider    QUESTIONS  Information for Provider? PTs mother sent over a form to be filled out for   the physical for special Olympics wants to know when she can grab the   paper  ---------------------------------------------------------------------------  --------------  [address]  What is the best way for the office to contact you? OK to leave message on   voicemail  Preferred Call Back Phone Number?  [phone]  ---------------------------------------------------------------------------  --------------  SCRIPT ANSWERS  undefined

## 2021-10-25 ENCOUNTER — TELEPHONE (OUTPATIENT)
Dept: FAMILY MEDICINE CLINIC | Age: 41
End: 2021-10-25

## 2021-10-27 NOTE — TELEPHONE ENCOUNTER
Do not see a form from special olympics in papers to be signed.  If they want to refax it to [facility] today while we are here Dr. Jens Sánchez can get it done today but we will not be in [facility] until Monday Nov 2nd

## 2021-10-27 NOTE — TELEPHONE ENCOUNTER
Form completed as able though since we did not have this at appointment some of the areas were not evaluated or completed. Vision may be accomplished at  if desired.   Thanks

## 2022-02-17 DIAGNOSIS — G40.909 SEIZURE DISORDER (HCC): ICD-10-CM

## 2022-02-17 RX ORDER — PHENOBARBITAL 32.4 MG/1
TABLET ORAL
Qty: 30 TABLET | Refills: 0 | Status: SHIPPED | OUTPATIENT
Start: 2022-02-17 | End: 2022-03-03 | Stop reason: SDUPTHER

## 2022-02-17 NOTE — TELEPHONE ENCOUNTER
Patient had appointment to establish care with [facility] on 2/21/22. Had to reschedule due to provider emergency. Patient will be out of medicine on Monday.       Jacques Morelso is requesting a refill on the following medication(s):  Requested Prescriptions     Pending Prescriptions Disp Refills    PHENobarbital (LUMINAL) 32.4 MG tablet 30 tablet 0       Last Visit Date (If Applicable):  Visit date not found    Next Visit Date:    3/7/2022

## 2022-03-03 DIAGNOSIS — G40.909 SEIZURE DISORDER (HCC): ICD-10-CM

## 2022-03-03 RX ORDER — PHENOBARBITAL 32.4 MG/1
TABLET ORAL
Qty: 30 TABLET | Refills: 0 | Status: SHIPPED | OUTPATIENT
Start: 2022-03-03 | End: 2022-03-07

## 2022-03-03 RX ORDER — PHENOBARBITAL 32.4 MG/1
TABLET ORAL
Qty: 30 TABLET | Refills: 0 | Status: SHIPPED | OUTPATIENT
Start: 2022-03-03 | End: 2022-03-07 | Stop reason: SDUPTHER

## 2022-03-03 NOTE — TELEPHONE ENCOUNTER
----- Message from Mone Beltran sent at 3/3/2022  7:41 AM EST -----  Subject: Message to Provider    QUESTIONS  Information for Provider? Patient's mother is calling because patient has   a scheduled appointment on 3/7 as new to provider. He is out of his   medications and would like to know if he get some until he comes in for   his appointment. Please advise  ---------------------------------------------------------------------------  --------------  CALL BACK INFO  What is the best way for the office to contact you? OK to leave message on   voicemail  Preferred Call Back Phone Number? [phone]  ---------------------------------------------------------------------------  --------------  SCRIPT ANSWERS  Relationship to Patient? Parent  Representative Name? Alhaji Grewal  Patient is under 25 and the Parent has custody? No  Is the Representative on the appropriate HIPAA document in Epic?  Yes

## 2022-03-03 NOTE — TELEPHONE ENCOUNTER
[facility] is requesting a refill on the following medication(s):  Requested Prescriptions     Pending Prescriptions Disp Refills    PHENobarbital (LUMINAL) 32.4 MG tablet 30 tablet 0     Sig: take 1 tablet by mouth three times a day       Last Visit Date (If Applicable):  Visit date not found    Next Visit Date:    3/7/2022

## 2022-03-03 NOTE — TELEPHONE ENCOUNTER
Patient is going to be out of medication tomorrow and doesn't see Dr Doc Flores until Monday. Can the medication be refilled. Needs PHENobarital 32.4 mg takes 1 tablet 3 times a day.  Please send to [facility] in [place]

## 2022-03-07 ENCOUNTER — OFFICE VISIT (OUTPATIENT)
Dept: FAMILY MEDICINE CLINIC | Age: 42
End: 2022-03-07
Payer: MEDICARE

## 2022-03-07 VITALS
WEIGHT: 153 LBS | SYSTOLIC BLOOD PRESSURE: 122 MMHG | HEART RATE: 100 BPM | TEMPERATURE: 98.8 F | HEIGHT: 68 IN | OXYGEN SATURATION: 99 % | BODY MASS INDEX: 23.19 KG/M2 | RESPIRATION RATE: 16 BRPM | DIASTOLIC BLOOD PRESSURE: 70 MMHG

## 2022-03-07 DIAGNOSIS — Z00.00 WELLNESS EXAMINATION: ICD-10-CM

## 2022-03-07 DIAGNOSIS — G40.909 SEIZURE DISORDER (HCC): Primary | ICD-10-CM

## 2022-03-07 DIAGNOSIS — E55.9 VITAMIN D DEFICIENCY: ICD-10-CM

## 2022-03-07 DIAGNOSIS — R31.29 MICROSCOPIC HEMATURIA: ICD-10-CM

## 2022-03-07 PROCEDURE — 81002 URINALYSIS NONAUTO W/O SCOPE: CPT | Performed by: FAMILY MEDICINE

## 2022-03-07 PROCEDURE — 99212 OFFICE O/P EST SF 10 MIN: CPT | Performed by: FAMILY MEDICINE

## 2022-03-07 PROCEDURE — PBSHW POCT URINALYSIS DIPSTICK: Performed by: FAMILY MEDICINE

## 2022-03-07 PROCEDURE — 99213 OFFICE O/P EST LOW 20 MIN: CPT | Performed by: FAMILY MEDICINE

## 2022-03-07 RX ORDER — PHENOBARBITAL 32.4 MG/1
TABLET ORAL
Qty: 30 TABLET | Refills: 5 | Status: SHIPPED | OUTPATIENT
Start: 2022-03-07 | End: 2022-04-04

## 2022-03-07 ASSESSMENT — ENCOUNTER SYMPTOMS
PHOTOPHOBIA: 0
SHORTNESS OF BREATH: 0
WHEEZING: 0
ABDOMINAL PAIN: 0
CHEST TIGHTNESS: 0

## 2022-03-07 NOTE — PATIENT INSTRUCTIONS
Nutrition Health Education:    Keep hydrated, walk 30 minutes minimum 3 times weekly as tolerated. Diet should consist of low fat, low sodium and high fiber. Nutritious foods such as fruits (if you're not diabetic), vegetables, lean meats, lean dairy, whole grains such as brown rice, quinoa, and dry beans. Hanna Toy with small amounts of heart healthy extra virgin olive oil. Be watchful of any extra salt/sugar/seasoning to your food. You should eat no more than 2 grams or 2,000 mg of salt daily. Salt will raise your BP. Avoid regular/diet sodas, caffeine, energy drinks as these are full of artificial ingredients/sweeteners, sugar, salt and chemicals that spike insulin and are harmful to your health. Sugar intake increases metabolic disfunction, type 2 diabetes, insulin resistance, addictive food behavior and obesity. Avoid all processed foods, foods from boxes, cans, microwave meals as these contain high salt, sugar or fat content and not much nutrition. Get at least 8 hrs of sleep every night and turn off all electronics at least 1 hour before bedtime as these decreases melatonin production and increases wakefulness. If your cholesterol is high, no greasy, fried, fast or fatty foods. Decrease red meat intake. No butter, catherine, lard or creams, no milk as these things clog your arteries and leads to heart attacks and death. If you smoke, smoking increases risk of lung disease, cancers, high BP, heart attack, stroke and death. Take your daily medications as prescribed and inform your family doctor if you are having any side effects or issues taking medications. Ordered a U/A as he had blood in urine but he couldn't go today so we sent him home with cup , he will return today with a urine specimen later.  He never came back with the urine    Refilled medication  return to office  For a wellness exam and follow up on blood work in 10/2022

## 2022-03-07 NOTE — PROGRESS NOTES
Name: Matt North  DOS: 3/7/2022  MRN: [id number]      Subjective:  Matt North is a 42 y.o. male being seen for   Chief Complaint   Patient presents with    Established New Doctor       Vitals:    03/07/22 0810   BP: 122/70   Pulse: 100   Resp: 16   Temp: 98.8 °F (37.1 °C)   SpO2: 99%     No Known Allergies  Past Medical History:   Diagnosis Date    Anxiety     Fragile X syndrome in male     Seizures ([facility] [address]      No past surgical history on file. Social History     Socioeconomic History    Marital status: Single     Spouse name: None    Number of children: None    Years of education: None    Highest education level: None   Occupational History    None   Tobacco Use    Smoking status: Never Smoker    Smokeless tobacco: Never Used   Substance and Sexual Activity    Alcohol use: None    Drug use: None    Sexual activity: None   Other Topics Concern    None   Social History Narrative    None     Social Determinants of Health     Financial Resource Strain: Low Risk     Difficulty of Paying Living Expenses: Not hard at all   Food Insecurity: No Food Insecurity    Worried About Running Out of Food in the Last Year: Never true    [address] in the Last Year: Never true   Transportation Needs:     Lack of Transportation (Medical): Not on file    Lack of Transportation (Non-Medical):  Not on file   Physical Activity:     Days of Exercise per Week: Not on file    Minutes of Exercise per Session: Not on file   Stress:     Feeling of Stress : Not on file   Social Connections:     Frequency of Communication with Friends and Family: Not on file    Frequency of Social Gatherings with Friends and Family: Not on file    Attends Congregation Services: Not on file    Active Member of Clubs or Organizations: Not on file    Attends Club or Organization Meetings: Not on file    Marital Status: Not on file   Intimate Partner Violence:     Fear of Current or Ex-Partner: Not on file    Emotionally Abused: Not on file    Physically Abused: Not on file    Sexually Abused: Not on file   Housing Stability:     Unable to Pay for Housing in the Last Year: Not on file    Number of Places Lived in the Last Year: Not on file    Unstable Housing in the Last Year: Not on file       Current Outpatient Medications   Medication Sig Dispense Refill    PHENobarbital (LUMINAL) 32.4 MG tablet take 1 tablet by mouth three times a day 30 tablet 5    vitamin D3 (CHOLECALCIFEROL) 25 MCG (1000 UT) TABS tablet Take 2 tablets by mouth daily 60 tablet 5    Ascorbic Acid (VITAMIN C) 500 MG tablet Take 500 mg by mouth daily       No current facility-administered medications for this visit. Objective:  Here with mom feels good today. Been a long time since he has had a seizure    Review of Systems   Constitutional: Negative for fatigue and unexpected weight change. Eyes: Negative for photophobia and visual disturbance. Respiratory: Negative for chest tightness, shortness of breath and wheezing. Cardiovascular: Negative for chest pain, palpitations and leg swelling. Gastrointestinal: Negative for abdominal pain. Genitourinary: Negative for difficulty urinating. Musculoskeletal: Negative for arthralgias and myalgias. Skin: Negative for rash and wound. Neurological: Positive for seizures (last seizure 1985). Negative for dizziness, tremors, weakness, light-headedness, numbness and headaches. Hematological: Does not bruise/bleed easily. Psychiatric/Behavioral: Positive for agitation (sometimes). Negative for confusion, self-injury, sleep disturbance and suicidal ideas. The patient is not nervous/anxious and is not hyperactive. Physical Exam  Constitutional:       Appearance: Normal appearance. HENT:      Head: Normocephalic and atraumatic.       Right Ear: Tympanic membrane and external ear normal.      Left Ear: Tympanic membrane and external ear normal.      Nose: Nose normal.      Mouth/Throat: Mouth: Mucous membranes are moist.      Pharynx: Oropharynx is clear. Eyes:      Extraocular Movements: Extraocular movements intact. Conjunctiva/sclera: Conjunctivae normal.      Pupils: Pupils are equal, round, and reactive to light. Cardiovascular:      Rate and Rhythm: Normal rate and regular rhythm. Pulses: Normal pulses. Heart sounds: Normal heart sounds. No murmur heard. Pulmonary:      Effort: Pulmonary effort is normal.      Breath sounds: Normal breath sounds. Abdominal:      General: Abdomen is flat. Bowel sounds are normal. There is no distension. Palpations: Abdomen is soft. There is no mass. Tenderness: There is no abdominal tenderness. There is no guarding. Musculoskeletal:         General: Normal range of motion. Cervical back: Normal range of motion and neck supple. Lymphadenopathy:      Cervical: No cervical adenopathy. Skin:     General: Skin is warm. Capillary Refill: Capillary refill takes less than 2 seconds. Neurological:      General: No focal deficit present. Mental Status: He is alert and oriented to person, place, and time. Psychiatric:         Mood and Affect: Mood normal.         Behavior: Behavior normal.         Thought Content: Thought content normal.          Assessment:   Diagnosis Orders   1. Seizure disorder (HCC)  Phenobarbital Level    CBC with Auto Differential    Comprehensive Metabolic Panel    Lipid Panel    TSH with Reflex    Vitamin D 25 Hydroxy    PHENobarbital (LUMINAL) 32.4 MG tablet   2. Vitamin D deficiency  Vitamin D 25 Hydroxy    CBC with Auto Differential    Comprehensive Metabolic Panel    Lipid Panel    TSH with Reflex    Vitamin D 25 Hydroxy   3. Microscopic hematuria  POCT Urinalysis no Micro    CBC with Auto Differential    Comprehensive Metabolic Panel    Lipid Panel    TSH with Reflex    Vitamin D 25 Hydroxy   4.  Wellness examination  CBC with Auto Differential    Comprehensive Metabolic Panel Lipid Panel    TSH with Reflex    Vitamin D 25 Hydroxy         Plan:  Orders Placed This Encounter   Procedures    Phenobarbital Level     Standing Status:   Future     Standing Expiration Date:   5/7/2022     Order Specific Question:   Dose Schedule & Time of Last Dose? Answer:   this am    Vitamin D 25 Hydroxy     Standing Status:   Future     Standing Expiration Date:   5/7/2022    CBC with Auto Differential     Standing Status:   Future     Standing Expiration Date:   3/7/2023    Comprehensive Metabolic Panel     Standing Status:   Future     Standing Expiration Date:   3/7/2023    Lipid Panel     Standing Status:   Future     Standing Expiration Date:   3/7/2023     Order Specific Question:   Is Patient Fasting?/# of Hours     Answer: Yes    TSH with Reflex     Standing Status:   Future     Standing Expiration Date:   3/7/2023    Vitamin D 25 Hydroxy     Standing Status:   Future     Standing Expiration Date:   3/7/2023    POCT Urinalysis no Micro         Patient Instructions   Nutrition Health Education:    Keep hydrated, walk 30 minutes minimum 3 times weekly as tolerated. Diet should consist of low fat, low sodium and high fiber. Nutritious foods such as fruits (if you're not diabetic), vegetables, lean meats, lean dairy, whole grains such as brown rice, quinoa, and dry beans. Karenann Sunny with small amounts of heart healthy extra virgin olive oil. Be watchful of any extra salt/sugar/seasoning to your food. You should eat no more than 2 grams or 2,000 mg of salt daily. Salt will raise your BP. Avoid regular/diet sodas, caffeine, energy drinks as these are full of artificial ingredients/sweeteners, sugar, salt and chemicals that spike insulin and are harmful to your health. Sugar intake increases metabolic disfunction, type 2 diabetes, insulin resistance, addictive food behavior and obesity.  Avoid all processed foods, foods from boxes, cans, microwave meals as these contain high salt, sugar or fat content and not much nutrition. Get at least 8 hrs of sleep every night and turn off all electronics at least 1 hour before bedtime as these decreases melatonin production and increases wakefulness. If your cholesterol is high, no greasy, fried, fast or fatty foods. Decrease red meat intake. No butter, catherine, lard or creams, no milk as these things clog your arteries and leads to heart attacks and death. If you smoke, smoking increases risk of lung disease, cancers, high BP, heart attack, stroke and death. Take your daily medications as prescribed and inform your family doctor if you are having any side effects or issues taking medications. Ordered a U/A as he had blood in urine but he couldn't go today so we sent him home with cup , he will return today with a urine specimen later    Refilled medication  return to office  For a wellness exam and follow up on blood work in 10/2022       Return in about 7 months (around 10/7/2022), or [facility], for REVIEW LABS.      Chichi Altamirano, DO

## 2022-03-09 LAB
BILIRUBIN, POC: NORMAL
BLOOD URINE, POC: NORMAL
CLARITY, POC: CLEAR
COLOR, POC: YELLOW
GLUCOSE URINE, POC: NORMAL
KETONES, POC: NORMAL
LEUKOCYTE EST, POC: NORMAL
NITRITE, POC: NORMAL
PH, POC: 6.5
PROTEIN, POC: NORMAL
SPECIFIC GRAVITY, POC: 1.02
UROBILINOGEN, POC: 1

## 2022-04-04 ENCOUNTER — TELEPHONE (OUTPATIENT)
Dept: FAMILY MEDICINE CLINIC | Age: 42
End: 2022-04-04

## 2022-04-04 LAB
ALBUMIN/GLOBULIN RATIO: 1.6 G/DL
ALBUMIN: 4.7 G/DL (ref 3.5–5)
ALP BLD-CCNC: 74 UNITS/L (ref 38–126)
ALT SERPL-CCNC: 19 UNITS/L (ref 4–50)
ANION GAP SERPL CALCULATED.3IONS-SCNC: 9.5 MMOL/L
AST SERPL-CCNC: 12 UNITS/L (ref 17–59)
BASOPHILS %: 1.22 (ref 0–3)
BASOPHILS ABSOLUTE: 0.08 (ref 0–0.3)
BILIRUB SERPL-MCNC: 0.3 MG/DL (ref 0.2–1.3)
BUN BLDV-MCNC: 14 MG/DL (ref 9–20)
CALCIUM SERPL-MCNC: 9.3 MG/DL (ref 8.4–10.2)
CHLORIDE BLD-SCNC: 104 MMOL/L (ref 98–120)
CHOLESTEROL/HDL RATIO: 2.42 RATIO (ref 0–4.5)
CHOLESTEROL: 109 MG/DL (ref 50–200)
CO2: 28 MMOL/L (ref 22–31)
CREAT SERPL-MCNC: 0.6 MG/DL (ref 0.7–1.3)
EOSINOPHILS %: 2.07 (ref 0–10)
EOSINOPHILS ABSOLUTE: 0.14 (ref 0–1.1)
GFR CALCULATED: > 60
GLOBULIN: 2.9 G/DL
GLUCOSE: 97 MG/DL (ref 75–110)
HCT VFR BLD CALC: 47.8 % (ref 42–52)
HDLC SERPL-MCNC: 45 MG/DL (ref 36–68)
HEMOGLOBIN: 15.1 (ref 13.8–17.8)
LDL CHOLESTEROL CALCULATED: 43.8 MG/DL (ref 0–160)
LYMPHOCYTE %: 44.78 (ref 20–51.1)
LYMPHOCYTES ABSOLUTE: 2.99 (ref 1–5.5)
MCH RBC QN AUTO: 30.6 PG (ref 28.5–32.5)
MCHC RBC AUTO-ENTMCNC: 31.6 G/DL (ref 32–37)
MCV RBC AUTO: 96.9 FL (ref 80–94)
MONOCYTES %: 7.53 (ref 1.7–9.3)
MONOCYTES ABSOLUTE: 0.5 (ref 0.1–1)
NEUTROPHILS %: 44.4 (ref 42.2–75.2)
NEUTROPHILS ABSOLUTE: 2.96 (ref 2–8.1)
PDW BLD-RTO: 13.2 % (ref 10–15.5)
PLATELET # BLD: 214.7 THOU/MM3 (ref 130–400)
POTASSIUM SERPL-SCNC: 4.5 MMOL/L (ref 3.6–5)
RBC: 4.93 M/UL (ref 4.7–6.1)
SODIUM BLD-SCNC: 142 MMOL/L (ref 135–145)
TOTAL PROTEIN, SERUM: 7.7 G/DL (ref 6.3–8.2)
TRIGL SERPL-MCNC: 101 MG/DL (ref 10–250)
TSH REFLEX FT4: 1.63 MIU/ML (ref 0.49–4.67)
VITAMIN D 25-HYDROXY: 52.2 NG/ML (ref 30–100)
VLDLC SERPL CALC-MCNC: 20 MG/DL (ref 0–50)
WBC: 6.7 THOU/ML3 (ref 4.8–10.8)

## 2022-04-05 LAB
PHENOBARBITAL DATE LAST DOSE: NORMAL
PHENOBARBITAL DOSE AMOUNT: NORMAL
PHENOBARBITAL TIME LAST DOSE: NORMAL
PHENOBARBITAL: 17.5 UG/ML (ref 15–40)

## 2022-04-07 ENCOUNTER — OFFICE VISIT (OUTPATIENT)
Dept: FAMILY MEDICINE CLINIC | Age: 42
End: 2022-04-07
Payer: MEDICARE

## 2022-04-07 VITALS
TEMPERATURE: 97 F | SYSTOLIC BLOOD PRESSURE: 108 MMHG | OXYGEN SATURATION: 99 % | DIASTOLIC BLOOD PRESSURE: 80 MMHG | HEART RATE: 73 BPM | RESPIRATION RATE: 16 BRPM

## 2022-04-07 DIAGNOSIS — G40.909 SEIZURE DISORDER (HCC): ICD-10-CM

## 2022-04-07 DIAGNOSIS — Q99.2 FRAGILE X SYNDROME: ICD-10-CM

## 2022-04-07 DIAGNOSIS — Z00.00 WELLNESS EXAMINATION: Primary | ICD-10-CM

## 2022-04-07 DIAGNOSIS — F41.9 ANXIETY: ICD-10-CM

## 2022-04-07 PROCEDURE — 99213 OFFICE O/P EST LOW 20 MIN: CPT

## 2022-04-07 PROCEDURE — 99396 PREV VISIT EST AGE 40-64: CPT | Performed by: FAMILY MEDICINE

## 2022-04-07 RX ORDER — PHENOBARBITAL 32.4 MG/1
32.4 TABLET ORAL 3 TIMES DAILY
Qty: 270 TABLET | Refills: 3 | Status: SHIPPED | OUTPATIENT
Start: 2022-04-07 | End: 2022-07-06

## 2022-04-07 RX ORDER — PHENOBARBITAL 32.4 MG/1
32.4 TABLET ORAL 3 TIMES DAILY
COMMUNITY
End: 2022-04-07 | Stop reason: SDUPTHER

## 2022-04-07 NOTE — PROGRESS NOTES
Name: Teretha Kawasaki  DOS: 4/7/2022  MRN: [id number]      Subjective:  Teretha Kawasaki is a 42 y.o. male being seen for   Chief Complaint   Patient presents with    Follow-up       Vitals:    04/07/22 1012   BP:    Pulse:    Resp: 16   Temp:    SpO2: 99%     No Known Allergies  Past Medical History:   Diagnosis Date    Anxiety     Fragile X syndrome in male     Seizures ([address]      No past surgical history on file. Social History     Socioeconomic History    Marital status: Single     Spouse name: None    Number of children: None    Years of education: None    Highest education level: None   Occupational History    None   Tobacco Use    Smoking status: Never Smoker    Smokeless tobacco: Never Used   Substance and Sexual Activity    Alcohol use: None    Drug use: None    Sexual activity: None   Other Topics Concern    None   Social History Narrative    None     Social Determinants of Health     Financial Resource Strain: Low Risk     Difficulty of Paying Living Expenses: Not hard at all   Food Insecurity: No Food Insecurity    Worried About Running Out of Food in the Last Year: Never true    [address] in the Last Year: Never true   Transportation Needs:     Lack of Transportation (Medical): Not on file    Lack of Transportation (Non-Medical):  Not on file   Physical Activity:     Days of Exercise per Week: Not on file    Minutes of Exercise per Session: Not on file   Stress:     Feeling of Stress : Not on file   Social Connections:     Frequency of Communication with Friends and Family: Not on file    Frequency of Social Gatherings with Friends and Family: Not on file    Attends Caoda[employer] Services: Not on file    Active Member of Clubs or Organizations: Not on file    Attends Club or Organization Meetings: Not on file    Marital Status: Not on file   Intimate Partner Violence:     Fear of Current or Ex-Partner: Not on file    Emotionally Abused: Not on file    Physically Abused: Not on file    Sexually Abused: Not on file   Housing Stability:     Unable to Pay for Housing in the Last Year: Not on file    Number of Places Lived in the Last Year: Not on file    Unstable Housing in the Last Year: Not on file       Current Outpatient Medications   Medication Sig Dispense Refill    PHENobarbital (LUMINAL) 32.4 MG tablet Take 1 tablet by mouth 3 times daily for 90 days. 270 tablet 3    vitamin D3 (CHOLECALCIFEROL) 25 MCG (1000 UT) TABS tablet Take 2 tablets by mouth daily 60 tablet 5    Ascorbic Acid (VITAMIN C) 500 MG tablet Take 500 mg by mouth daily       No current facility-administered medications for this visit. Objective:  HPI  Review of Systems  Physical Exam  Constitutional:       General: He is not in acute distress. Appearance: Normal appearance. He is not ill-appearing or toxic-appearing. HENT:      Head: Normocephalic and atraumatic. Right Ear: Tympanic membrane, ear canal and external ear normal. There is no impacted cerumen. Left Ear: Tympanic membrane, ear canal and external ear normal. There is no impacted cerumen. Nose: Nose normal.      Mouth/Throat:      Mouth: Mucous membranes are moist.      Pharynx: Oropharynx is clear. Eyes:      Extraocular Movements: Extraocular movements intact. Conjunctiva/sclera: Conjunctivae normal.      Pupils: Pupils are equal, round, and reactive to light. Neck:      Vascular: No carotid bruit. Cardiovascular:      Rate and Rhythm: Normal rate and regular rhythm. Pulses: Normal pulses. Heart sounds: Normal heart sounds. No murmur heard. Pulmonary:      Effort: Pulmonary effort is normal.      Breath sounds: Normal breath sounds. No wheezing, rhonchi or rales. Chest:      Chest wall: No tenderness. Abdominal:      General: Abdomen is flat. Bowel sounds are normal. There is no distension. Palpations: Abdomen is soft. There is no mass. Tenderness:  There is no abdominal tenderness. There is no guarding. Musculoskeletal:         General: Normal range of motion. Cervical back: Normal range of motion and neck supple. No rigidity or tenderness. Right lower leg: No edema. Left lower leg: No edema. Lymphadenopathy:      Cervical: No cervical adenopathy. Skin:     General: Skin is warm. Capillary Refill: Capillary refill takes less than 2 seconds. Neurological:      General: No focal deficit present. Mental Status: He is alert and oriented to person, place, and time. Motor: No weakness. Gait: Gait normal.   Psychiatric:         Mood and Affect: Mood normal.         Behavior: Behavior normal.         Thought Content: Thought content normal.          Assessment:   Diagnosis Orders   1. Wellness examination     2. Seizure disorder (HCC)  Phenobarbital Level    PHENobarbital (LUMINAL) 32.4 MG tablet   3. Fragile X syndrome     4. Anxiety           Plan:  Orders Placed This Encounter   Procedures    Phenobarbital Level     Standing Status:   Future     Standing Expiration Date:   10/7/2022     Order Specific Question:   Dose Schedule & Time of Last Dose? Answer:   daily         Patient Instructions   Nutrition Health Education:    Keep hydrated, walk 30 minutes minimum 3 times weekly as tolerated. Diet should consist of low fat, low sodium and high fiber. Nutritious foods such as fruits (if you're not diabetic), vegetables, lean meats, lean dairy, whole grains such as brown rice, quinoa, and dry beans. Rannie Rang with small amounts of heart healthy extra virgin olive oil. Be watchful of any extra salt/sugar/seasoning to your food. You should eat no more than 2 grams or 2,000 mg of salt daily. Salt will raise your BP. Avoid regular/diet sodas, caffeine, energy drinks as these are full of artificial ingredients/sweeteners, sugar, salt and chemicals that spike insulin and are harmful to your health.  Sugar intake increases metabolic disfunction, type 2 diabetes, insulin resistance, addictive food behavior and obesity. Avoid all processed foods, foods from boxes, cans, microwave meals as these contain high salt, sugar or fat content and not much nutrition. Get at least 8 hrs of sleep every night and turn off all electronics at least 1 hour before bedtime as these decreases melatonin production and increases wakefulness. If your cholesterol is high, no greasy, fried, fast or fatty foods. Decrease red meat intake. No butter, catherine, lard or creams, no milk as these things clog your arteries and leads to heart attacks and death. If you smoke, smoking increases risk of lung disease, cancers, high BP, heart attack, stroke and death. Take your daily medications as prescribed and inform your family doctor if you are having any side effects or issues taking medications.     Recheck phenobarbital level in three months  Refilled medication     1 year for yearly and as needed       Return in about 1 year (around 4/7/2023) for Christina Wright, DO

## 2022-04-07 NOTE — PATIENT INSTRUCTIONS
Nutrition Health Education:    Keep hydrated, walk 30 minutes minimum 3 times weekly as tolerated. Diet should consist of low fat, low sodium and high fiber. Nutritious foods such as fruits (if you're not diabetic), vegetables, lean meats, lean dairy, whole grains such as brown rice, quinoa, and dry beans. Yuliana Rafael Gonzalez with small amounts of heart healthy extra virgin olive oil. Be watchful of any extra salt/sugar/seasoning to your food. You should eat no more than 2 grams or 2,000 mg of salt daily. Salt will raise your BP. Avoid regular/diet sodas, caffeine, energy drinks as these are full of artificial ingredients/sweeteners, sugar, salt and chemicals that spike insulin and are harmful to your health. Sugar intake increases metabolic disfunction, type 2 diabetes, insulin resistance, addictive food behavior and obesity. Avoid all processed foods, foods from boxes, cans, microwave meals as these contain high salt, sugar or fat content and not much nutrition. Get at least 8 hrs of sleep every night and turn off all electronics at least 1 hour before bedtime as these decreases melatonin production and increases wakefulness. If your cholesterol is high, no greasy, fried, fast or fatty foods. Decrease red meat intake. No butter, catherine, lard or creams, no milk as these things clog your arteries and leads to heart attacks and death. If you smoke, smoking increases risk of lung disease, cancers, high BP, heart attack, stroke and death. Take your daily medications as prescribed and inform your family doctor if you are having any side effects or issues taking medications.     Recheck phenobarbital level in three months  Refilled medication     1 year for yearly and as needed

## 2022-07-08 LAB
PHENOBARBITAL DATE LAST DOSE: NORMAL
PHENOBARBITAL DOSE AMOUNT: NORMAL
PHENOBARBITAL TIME LAST DOSE: NORMAL
PHENOBARBITAL: 16 UG/ML (ref 15–40)

## 2022-07-11 ENCOUNTER — OFFICE VISIT (OUTPATIENT)
Dept: FAMILY MEDICINE CLINIC | Age: 42
End: 2022-07-11
Payer: MEDICARE

## 2022-07-11 VITALS
DIASTOLIC BLOOD PRESSURE: 82 MMHG | TEMPERATURE: 97.5 F | SYSTOLIC BLOOD PRESSURE: 130 MMHG | OXYGEN SATURATION: 97 % | RESPIRATION RATE: 16 BRPM | HEART RATE: 82 BPM | HEIGHT: 68 IN | WEIGHT: 156 LBS | BODY MASS INDEX: 23.64 KG/M2

## 2022-07-11 DIAGNOSIS — H61.21 RIGHT EAR IMPACTED CERUMEN: ICD-10-CM

## 2022-07-11 DIAGNOSIS — G40.909 SEIZURE DISORDER (HCC): Primary | ICD-10-CM

## 2022-07-11 PROCEDURE — 99214 OFFICE O/P EST MOD 30 MIN: CPT | Performed by: FAMILY MEDICINE

## 2022-07-11 PROCEDURE — 69209 REMOVE IMPACTED EAR WAX UNI: CPT | Performed by: FAMILY MEDICINE

## 2022-07-11 PROCEDURE — 99212 OFFICE O/P EST SF 10 MIN: CPT | Performed by: FAMILY MEDICINE

## 2022-07-11 RX ORDER — PHENOBARBITAL 32.4 MG/1
TABLET ORAL
COMMUNITY
Start: 2022-07-06 | End: 2022-10-07 | Stop reason: SDUPTHER

## 2022-07-11 ASSESSMENT — ENCOUNTER SYMPTOMS
SHORTNESS OF BREATH: 0
CHEST TIGHTNESS: 0
CHOKING: 0
WHEEZING: 0
ABDOMINAL PAIN: 0
COUGH: 0

## 2022-07-11 NOTE — PATIENT INSTRUCTIONS
Nutrition Health Education:    Keep hydrated, walk 30 minutes minimum 3 times weekly as tolerated. Diet should consist of low fat, low sodium and high fiber. Nutritious foods such as fruits (if you're not diabetic), vegetables, lean meats, lean dairy, whole grains such as brown rice, quinoa, and dry beans. Fleurette Malhotra with small amounts of heart healthy extra virgin olive oil. Be watchful of any extra salt/sugar/seasoning to your food. You should eat no more than 2 grams or 2,000 mg of salt daily. Salt will raise your BP. Avoid regular/diet sodas, caffeine, energy drinks as these are full of artificial ingredients/sweeteners, sugar, salt and chemicals that spike insulin and are harmful to your health. Sugar intake increases metabolic disfunction, type 2 diabetes, insulin resistance, addictive food behavior and obesity. Avoid all processed foods, foods from boxes, cans, microwave meals as these contain high salt, sugar or fat content and not much nutrition. Get at least 8 hrs of sleep every night and turn off all electronics at least 1 hour before bedtime as these decreases melatonin production and increases wakefulness. If your cholesterol is high, no greasy, fried, fast or fatty foods. Decrease red meat intake. No butter, catherine, lard or creams, no milk as these things clog your arteries and leads to heart attacks and death. If you smoke, smoking increases risk of lung disease, cancers, high BP, heart attack, stroke and death. Take your daily medications as prescribed and inform your family doctor if you are having any side effects or issues taking medications.     Reviewed phenobarbital levels with pt    Phenobarbital level in 6 months and follow up    S/P ear irrigation: If you develop ear pain or discharge call me as you may have and ear infection and I may call in medicated ear drops    Follow up in 6 months

## 2022-07-11 NOTE — PROGRESS NOTES
Name: Marcus Forrester  DOS: 7/11/2022  MRN: [id number]      Subjective:  Marcus Forrester is a 42 y.o. male being seen for   Chief Complaint   Patient presents with    Anxiety    Follow-up     3 month follow up- labs completed on 04/04/2022       Vitals:    07/11/22 1052   BP: 130/82   Pulse: 82   Resp: 16   Temp: 97.5 °F (36.4 °C)   SpO2: 97%     No Known Allergies  Past Medical History:   Diagnosis Date    Anxiety     Fragile X syndrome in male     Seizures ([address]      No past surgical history on file. Social History     Socioeconomic History    Marital status: Single     Spouse name: None    Number of children: None    Years of education: None    Highest education level: None   Occupational History    None   Tobacco Use    Smoking status: Never Smoker    Smokeless tobacco: Never Used   Substance and Sexual Activity    Alcohol use: None    Drug use: None    Sexual activity: None   Other Topics Concern    None   Social History Narrative    None     Social Determinants of Health     Financial Resource Strain: Low Risk     Difficulty of Paying Living Expenses: Not hard at all   Food Insecurity: No Food Insecurity    Worried About Running Out of Food in the Last Year: Never true    [address] in the Last Year: Never true   Transportation Needs:     Lack of Transportation (Medical): Not on file    Lack of Transportation (Non-Medical):  Not on file   Physical Activity:     Days of Exercise per Week: Not on file    Minutes of Exercise per Session: Not on file   Stress:     Feeling of Stress : Not on file   Social Connections:     Frequency of Communication with Friends and Family: Not on file    Frequency of Social Gatherings with Friends and Family: Not on file    Attends [employer] Services: Not on file    Active Member of Clubs or Organizations: Not on file    Attends Club or Organization Meetings: Not on file    Marital Status: Not on file   Intimate Partner Violence:     Fear of Current or Ex-Partner: Not on file    Emotionally Abused: Not on file    Physically Abused: Not on file    Sexually Abused: Not on file   Housing Stability:     Unable to Pay for Housing in the Last Year: Not on file    Number of Places Lived in the Last Year: Not on file    Unstable Housing in the Last Year: Not on file       Current Outpatient Medications   Medication Sig Dispense Refill    PHENobarbital (LUMINAL) 32.4 MG tablet take 1 tablet by mouth three times a day      vitamin D3 (CHOLECALCIFEROL) 25 MCG (1000 UT) TABS tablet Take 2 tablets by mouth daily 60 tablet 5    Ascorbic Acid (VITAMIN C) 500 MG tablet Take 500 mg by mouth daily       No current facility-administered medications for this visit. Objective:  Pt is here for follow up, here with mom . Feels good today. Review of Systems   Constitutional: Negative for fatigue and unexpected weight change. Eyes: Negative for visual disturbance. Respiratory: Negative for cough, choking, chest tightness, shortness of breath and wheezing. Cardiovascular: Negative for chest pain, palpitations and leg swelling. Gastrointestinal: Negative for abdominal pain. Genitourinary: Negative for difficulty urinating and hematuria. Musculoskeletal: Negative for arthralgias and myalgias. Skin: Negative for rash and wound. Neurological: Negative for dizziness, weakness, light-headedness, numbness and headaches. Hematological: Negative for adenopathy. Does not bruise/bleed easily. Psychiatric/Behavioral: Negative for agitation, confusion, decreased concentration and suicidal ideas. Physical Exam  Constitutional:       General: He is not in acute distress. Appearance: Normal appearance. He is not ill-appearing, toxic-appearing or diaphoretic. HENT:      Head: Normocephalic and atraumatic.       Right Ear: Tympanic membrane, ear canal and external ear normal. There is impacted cerumen (s/p irrigation TMI, no erythema, no bleeding pt tolerated well). Left Ear: Tympanic membrane, ear canal and external ear normal. There is no impacted cerumen. Nose: Nose normal. No congestion or rhinorrhea. Mouth/Throat:      Mouth: Mucous membranes are moist.      Pharynx: Oropharynx is clear. No oropharyngeal exudate or posterior oropharyngeal erythema. Eyes:      Extraocular Movements: Extraocular movements intact. Conjunctiva/sclera: Conjunctivae normal.      Pupils: Pupils are equal, round, and reactive to light. Cardiovascular:      Rate and Rhythm: Normal rate and regular rhythm. Pulses: Normal pulses. Heart sounds: Normal heart sounds. No murmur heard. Pulmonary:      Effort: Pulmonary effort is normal.      Breath sounds: Normal breath sounds. No wheezing, rhonchi or rales. Abdominal:      General: Abdomen is flat. Bowel sounds are normal. There is no distension. Palpations: Abdomen is soft. There is no mass. Tenderness: There is no abdominal tenderness. There is no right CVA tenderness, left CVA tenderness or guarding. Musculoskeletal:         General: Normal range of motion. Cervical back: Normal range of motion and neck supple. Right lower leg: No edema. Left lower leg: No edema. Lymphadenopathy:      Cervical: No cervical adenopathy. Skin:     General: Skin is warm. Capillary Refill: Capillary refill takes less than 2 seconds. Neurological:      General: No focal deficit present. Mental Status: He is alert and oriented to person, place, and time. Motor: No weakness. Coordination: Coordination normal.      Gait: Gait normal.   Psychiatric:         Mood and Affect: Mood normal.         Behavior: Behavior normal.         Thought Content: Thought content normal.          Assessment:   Diagnosis Orders   1. Seizure disorder (HCC)  Phenobarbital Level   2.  Right ear impacted cerumen  [place] REMOVAL IMPACTED CERUMEN IRRIGATION/LVG UNILAT         Plan:  Orders Placed This Encounter   Procedures    Phenobarbital Level     Standing Status:   Future     Standing Expiration Date:   7/11/2023     Order Specific Question:   Dose Schedule & Time of Last Dose? Answer:   takes in am 6:00 am    DC REMOVAL IMPACTED CERUMEN IRRIGATION/LVG UNILAT         Patient Instructions   Nutrition Health Education:    Keep hydrated, walk 30 minutes minimum 3 times weekly as tolerated. Diet should consist of low fat, low sodium and high fiber. Nutritious foods such as fruits (if you're not diabetic), vegetables, lean meats, lean dairy, whole grains such as brown rice, quinoa, and dry beans. Genevia Main with small amounts of heart healthy extra virgin olive oil. Be watchful of any extra salt/sugar/seasoning to your food. You should eat no more than 2 grams or 2,000 mg of salt daily. Salt will raise your BP. Avoid regular/diet sodas, caffeine, energy drinks as these are full of artificial ingredients/sweeteners, sugar, salt and chemicals that spike insulin and are harmful to your health. Sugar intake increases metabolic disfunction, type 2 diabetes, insulin resistance, addictive food behavior and obesity. Avoid all processed foods, foods from boxes, cans, microwave meals as these contain high salt, sugar or fat content and not much nutrition. Get at least 8 hrs of sleep every night and turn off all electronics at least 1 hour before bedtime as these decreases melatonin production and increases wakefulness. If your cholesterol is high, no greasy, fried, fast or fatty foods. Decrease red meat intake. No butter, catherine, lard or creams, no milk as these things clog your arteries and leads to heart attacks and death. If you smoke, smoking increases risk of lung disease, cancers, high BP, heart attack, stroke and death. Take your daily medications as prescribed and inform your family doctor if you are having any side effects or issues taking medications.     Reviewed phenobarbital levels with pt    Phenobarbital level in 6 months and follow up    S/P ear irrigation: If you develop ear pain or discharge call me as you may have and ear infection and I may call in medicated ear drops    Follow up in 6 months       Return in about 6 months (around 1/11/2023) for REVIEW LABS.      Va Gasca, DO

## 2022-10-07 DIAGNOSIS — G40.909 SEIZURE DISORDER (HCC): Primary | ICD-10-CM

## 2022-10-07 RX ORDER — PHENOBARBITAL 32.4 MG/1
TABLET ORAL
Qty: 90 TABLET | Refills: 2 | Status: SHIPPED | OUTPATIENT
Start: 2022-10-07 | End: 2022-10-27 | Stop reason: SDUPTHER

## 2022-10-07 RX ORDER — PHENOBARBITAL 32.4 MG/1
TABLET ORAL
OUTPATIENT
Start: 2022-10-07

## 2022-10-07 NOTE — TELEPHONE ENCOUNTER
Dina Casey is requesting a refill on the following medication(s):  Requested Prescriptions     Pending Prescriptions Disp Refills    PHENobarbital (LUMINAL) 32.4 MG tablet 90 tablet 1     Sig: take 1 tablet by mouth three times a day       Last Visit Date (If Applicable):  6/52/4084    Next Visit Date:    1/9/2023

## 2022-10-27 DIAGNOSIS — G40.909 SEIZURE DISORDER (HCC): ICD-10-CM

## 2022-10-27 RX ORDER — PHENOBARBITAL 32.4 MG/1
TABLET ORAL
Qty: 90 TABLET | Refills: 2 | Status: SHIPPED | OUTPATIENT
Start: 2022-10-27 | End: 2022-10-29 | Stop reason: SDUPTHER

## 2022-10-27 NOTE — TELEPHONE ENCOUNTER
Requested Prescriptions     Pending Prescriptions Disp Refills    PHENobarbital (LUMINAL) 32.4 MG tablet 90 tablet 2     Sig: take 1 tablet by mouth three times a day      Former Jim Muñoz pt. Seeing you on 1/9/2023.

## 2022-10-28 DIAGNOSIS — G40.909 SEIZURE DISORDER (HCC): ICD-10-CM

## 2022-10-28 NOTE — TELEPHONE ENCOUNTER
Patient is out please refill. Wife is hoping for a fill today if possible.         Wilberguido Lim is requesting a refill on the following medication(s):  Requested Prescriptions     Pending Prescriptions Disp Refills    PHENobarbital (LUMINAL) 32.4 MG tablet 90 tablet 2     Sig: take 1 tablet by mouth three times a day       Last Visit Date (If Applicable):  6/70/6955    Next Visit Date:    1/9/2023

## 2022-10-29 RX ORDER — PHENOBARBITAL 32.4 MG/1
TABLET ORAL
Qty: 90 TABLET | Refills: 2 | Status: SHIPPED | OUTPATIENT
Start: 2022-10-29 | End: 2022-11-28